# Patient Record
Sex: FEMALE | Race: WHITE | Employment: OTHER | URBAN - METROPOLITAN AREA
[De-identification: names, ages, dates, MRNs, and addresses within clinical notes are randomized per-mention and may not be internally consistent; named-entity substitution may affect disease eponyms.]

---

## 2017-02-13 RX ORDER — FUROSEMIDE 40 MG/1
40 TABLET ORAL DAILY
Qty: 90 TAB | Refills: 1 | Status: SHIPPED | COMMUNITY
Start: 2017-02-13 | End: 2017-08-12 | Stop reason: SDUPTHER

## 2017-02-13 RX ORDER — AMLODIPINE BESYLATE 5 MG/1
TABLET ORAL
Qty: 90 TAB | Refills: 1 | Status: SHIPPED | COMMUNITY
Start: 2017-02-13 | End: 2017-08-12 | Stop reason: SDUPTHER

## 2017-02-13 RX ORDER — ESOMEPRAZOLE MAGNESIUM 40 MG/1
CAPSULE, DELAYED RELEASE ORAL
Qty: 90 CAP | Refills: 1 | Status: SHIPPED | COMMUNITY
Start: 2017-02-13 | End: 2017-08-12 | Stop reason: SDUPTHER

## 2017-02-13 NOTE — TELEPHONE ENCOUNTER
Orders Placed This Encounter    NEXIUM 40 mg capsule     Sig: TAKE 1 CAPSULE DAILY     Dispense:  90 Cap     Refill:  1    amLODIPine (NORVASC) 5 mg tablet     Sig: TAKE 1 TABLET DAILY     Dispense:  90 Tab     Refill:  1    furosemide (LASIX) 40 mg tablet     Sig: Take 1 Tab by mouth daily. Dispense:  90 Tab     Refill:  1     The above medication refills were approved via verbal order by Dr. Tim Roberto III.

## 2017-04-04 RX ORDER — CLOPIDOGREL BISULFATE 75 MG/1
TABLET ORAL
Qty: 90 TAB | Refills: 0 | Status: SHIPPED | OUTPATIENT
Start: 2017-04-04 | End: 2017-07-03 | Stop reason: SDUPTHER

## 2017-05-11 RX ORDER — TELMISARTAN 40 MG/1
TABLET ORAL
Qty: 90 TAB | Refills: 0 | Status: SHIPPED | OUTPATIENT
Start: 2017-05-11 | End: 2017-08-09 | Stop reason: SDUPTHER

## 2017-07-03 RX ORDER — CLOPIDOGREL BISULFATE 75 MG/1
TABLET ORAL
Qty: 90 TAB | Refills: 1 | Status: SHIPPED | COMMUNITY
Start: 2017-07-03

## 2017-07-03 NOTE — TELEPHONE ENCOUNTER
Orders Placed This Encounter    clopidogrel (PLAVIX) 75 mg tab     Sig: TAKE 1 TABLET DAILY     Dispense:  90 Tab     Refill:  1     The above medication refills were approved via verbal order by Dr. Son Solorzano III.

## 2017-08-09 RX ORDER — TELMISARTAN 40 MG/1
TABLET ORAL
Qty: 90 TAB | Refills: 3 | Status: SHIPPED | OUTPATIENT
Start: 2017-08-09

## 2017-08-13 RX ORDER — AMLODIPINE BESYLATE 5 MG/1
TABLET ORAL
Qty: 90 TAB | Refills: 0 | Status: SHIPPED | OUTPATIENT
Start: 2017-08-13

## 2017-08-13 RX ORDER — FUROSEMIDE 40 MG/1
TABLET ORAL
Qty: 90 TAB | Refills: 0 | Status: SHIPPED | OUTPATIENT
Start: 2017-08-13

## 2017-08-13 RX ORDER — ESOMEPRAZOLE MAGNESIUM 40 MG/1
CAPSULE, DELAYED RELEASE ORAL
Qty: 90 CAP | Refills: 0 | Status: SHIPPED | OUTPATIENT
Start: 2017-08-13

## 2017-09-06 RX ORDER — SERTRALINE HYDROCHLORIDE 100 MG/1
TABLET, FILM COATED ORAL
Qty: 135 TAB | Refills: 4 | Status: SHIPPED | OUTPATIENT
Start: 2017-09-06 | End: 2018-02-12 | Stop reason: DRUGHIGH

## 2017-09-29 ENCOUNTER — TELEPHONE (OUTPATIENT)
Dept: INTERNAL MEDICINE CLINIC | Age: 82
End: 2017-09-29

## 2017-09-29 NOTE — TELEPHONE ENCOUNTER
[phone] daughter calling regarding an message she sent to dr Lewis Asher last night regarding her mother and has not heard anything back. Wants to know if he received it.

## 2017-10-03 LAB — CREATININE, EXTERNAL: 0.8

## 2017-11-17 RX ORDER — CEPHALEXIN 250 MG/1
CAPSULE ORAL
Qty: 30 CAP | Refills: 5 | Status: SHIPPED | OUTPATIENT
Start: 2017-11-17 | End: 2018-02-12 | Stop reason: SDUPTHER

## 2017-11-24 RX ORDER — CEPHALEXIN 250 MG/1
CAPSULE ORAL
Qty: 30 CAP | Refills: 5 | OUTPATIENT
Start: 2017-11-24

## 2018-01-01 ENCOUNTER — TELEPHONE (OUTPATIENT)
Dept: INTERNAL MEDICINE CLINIC | Age: 83
End: 2018-01-01

## 2018-01-01 RX ORDER — SERTRALINE HYDROCHLORIDE 25 MG/1
25 TABLET, FILM COATED ORAL DAILY
Qty: 90 TAB | Refills: 1 | Status: SHIPPED | OUTPATIENT
Start: 2018-01-01 | End: 2019-01-01 | Stop reason: SDUPTHER

## 2018-02-12 ENCOUNTER — OFFICE VISIT (OUTPATIENT)
Dept: INTERNAL MEDICINE CLINIC | Age: 83
End: 2018-02-12

## 2018-02-12 ENCOUNTER — TELEPHONE (OUTPATIENT)
Dept: INTERNAL MEDICINE CLINIC | Age: 83
End: 2018-02-12

## 2018-02-12 VITALS — RESPIRATION RATE: 12 BRPM | TEMPERATURE: 97.6 F | OXYGEN SATURATION: 93 % | HEART RATE: 50 BPM

## 2018-02-12 DIAGNOSIS — G30.1 LATE ONSET ALZHEIMER'S DISEASE WITHOUT BEHAVIORAL DISTURBANCE (HCC): ICD-10-CM

## 2018-02-12 DIAGNOSIS — F02.80 LATE ONSET ALZHEIMER'S DISEASE WITHOUT BEHAVIORAL DISTURBANCE (HCC): ICD-10-CM

## 2018-02-12 DIAGNOSIS — I48.20 CHRONIC ATRIAL FIBRILLATION (HCC): ICD-10-CM

## 2018-02-12 DIAGNOSIS — E03.9 ACQUIRED HYPOTHYROIDISM: ICD-10-CM

## 2018-02-12 DIAGNOSIS — I50.22 CHRONIC SYSTOLIC CONGESTIVE HEART FAILURE (HCC): ICD-10-CM

## 2018-02-12 DIAGNOSIS — L97.519 ULCER OF RIGHT FOOT, UNSPECIFIED ULCER STAGE (HCC): ICD-10-CM

## 2018-02-12 DIAGNOSIS — Z23 NEED FOR SHINGLES VACCINE: Primary | ICD-10-CM

## 2018-02-12 RX ORDER — MORPHINE SULFATE ORAL SOLUTION 10 MG/5ML
0.25 SOLUTION ORAL AS NEEDED
COMMUNITY

## 2018-02-12 RX ORDER — IPRATROPIUM BROMIDE AND ALBUTEROL SULFATE 2.5; .5 MG/3ML; MG/3ML
3 SOLUTION RESPIRATORY (INHALATION)
COMMUNITY

## 2018-02-12 RX ORDER — SERTRALINE HYDROCHLORIDE 25 MG/1
25 TABLET, FILM COATED ORAL DAILY
Qty: 30 TAB | Refills: 1 | Status: SHIPPED | OUTPATIENT
Start: 2018-02-12 | End: 2018-02-12

## 2018-02-12 RX ORDER — SERTRALINE HYDROCHLORIDE 25 MG/1
25 TABLET, FILM COATED ORAL DAILY
Qty: 30 TAB | Refills: 1 | Status: SHIPPED | OUTPATIENT
Start: 2018-02-12 | End: 2018-01-01 | Stop reason: SDUPTHER

## 2018-02-12 RX ORDER — CEPHALEXIN 250 MG/1
250 CAPSULE ORAL 3 TIMES DAILY
Qty: 42 CAP | Refills: 0 | Status: SHIPPED | OUTPATIENT
Start: 2018-02-12 | End: 2018-02-12

## 2018-02-12 RX ORDER — CEPHALEXIN 250 MG/1
250 CAPSULE ORAL 3 TIMES DAILY
Qty: 42 CAP | Refills: 0 | Status: SHIPPED | OUTPATIENT
Start: 2018-02-12 | End: 2018-04-13 | Stop reason: SDUPTHER

## 2018-02-12 NOTE — PATIENT INSTRUCTIONS
As discussed in your appointment today, [address] is an important part of planning for your healthcare future. Discussing your preferences with your family and your care team is a part of good healthcare so that we can be guided by your known values and goals. Our office offers this service for you at your convenience. Our Nurse Navigators and certified [employer] Facilitators, Checo Patton and Briana Stauffer typically schedule family appointments for this service on Wednesdays. To schedule an Advance Care Planning visit or to receive more information about this service, please call [facility] at [phone] and ask to speak directly to Mikie Russ or [facility]. Advance Care Planning: Care Instructions  Your Care Instructions  It can be hard to live with an illness that cannot be cured. But if your health is getting worse, you may want to make decisions about end-of-life care. Planning for the end of your life does not mean that you are giving up. It is a way to make sure that your wishes are met. Clearly stating your wishes can make it easier for your loved ones. Making plans while you are still able may also ease your mind and make your final days less stressful and more meaningful. Follow-up care is a key part of your treatment and safety. Be sure to make and go to all appointments, and call your doctor if you are having problems. It's also a good idea to know your test results and keep a list of the medicines you take. What can you do to plan for the end of life? · You can bring these issues up with your doctor. You do not need to wait until your doctor starts the conversation. You might start with \"I would not be willing to live with . Omid Jews Omid Jews Omid Jews \" When you complete this sentence it helps your doctor understand your wishes. · Talk openly and honestly with your doctor. This is the best way to understand the decisions you will need to make as your health changes.  Know that you can always change your mind. · Ask your doctor about commonly used life-support measures. These include tube feedings, breathing machines, and fluids given through a vein (IV). Understanding these treatments will help you decide whether you want them. · You may choose to have these life-supporting treatments for a limited time. This allows a trial period to see whether they will help you. You may also decide that you want your doctor to take only certain measures to keep you alive. It is important to spell out these conditions so that your doctor and family understand them. · Talk to your doctor about how long you are likely to live. He or she may be able to give you an idea of what usually happens with your specific illness. · Think about preparing papers that state your wishes. This way there will not be any confusion about what you want. You can change your instructions at any time. Which papers should you prepare? Advance directives are legal papers that tell doctors how you want to be cared for at the end of your life. You do not need a  to write these papers. Ask your doctor or your state health department for information on how to write your advance directives. They may have the forms for each of these types of papers. Make sure your doctor has a copy of these on file, and give a copy to a family member or close friend. · Consider a do-not-resuscitate order (DNR). This order asks that no extra treatments be done if your heart stops or you stop breathing. Extra treatments may include electrical shock to restart your heart or a machine to breathe for you. If you decide to have a DNR order, ask your doctor to explain and write it. Place the order in your home where everyone can easily see it. · Consider a living will. A living will explains your wishes in case you are in a coma or cannot communicate. Living talbot tell doctors to use or not use treatments that would keep you alive.  You must have one or two witnesses or a notary present when you sign this form. · Consider a durable power of . This allows you to name a person to make decisions about your care if you are not able to. Most people ask a close friend or family member. Talk to this person about the kinds of treatments you want and those that you do not want. Make sure this person understands your wishes. If this person is not the health care agent named in your advance directive, also talk with your health care agent. These legal papers are simple to change. Tell your doctor what you want to change, and ask him or her to make a note in your medical file. Give your family updated copies of the papers.

## 2018-02-12 NOTE — PROGRESS NOTES
HPI:  Nick Portillo is a 80y.o. year old female who is here for a routine visit:    Here for a follow-up visit after a years absence. She has a history of progressive dementia and is now completely nonverbal.  She lives in assisted living and her daughter is with her now to provide history. Somehow her UTI prophylactic antibiotics have been stopped by hospice. This has now been discontinued and she is now back in the care of the facility. She does note increasing difficulty with tearfulness and appears to be depressed. She has been off of her Zoloft per hospice as well. Concern was in the past about a swallowing issue. We tried to avoid any further evaluation of that given her progression of dementia. She has an appointment coming up with her cardiologist as well for follow-up of her pacemaker. Her daughters also noted that her right foot has a skin lesion on it that will not heal.  Her left foot has a toenail that was nearly avulsed by a nurses aide putting a sock on. She is concerned about possibility of peripheral vascular disease. She does have some swelling in toes of both feet. Denies any change in bowel or bladder habits per daughter.       Past Medical History:   Diagnosis Date    Arrhythmia     A fib    Arthritis     Atrial fibrillation ([address] 12/21/2010    CAD (coronary artery disease)     Calculus of kidney     Chronic obstructive pulmonary disease (HCC)     Congestive heart failure, unspecified 12/21/2010    Depression     GERD (gastroesophageal reflux disease)     Hypercholesterolemia     Hypertension     Thyroid disease     hypo    Unspecified essential hypertension 12/21/2010    Unspecified hypothyroidism 12/21/2010    Upper GI bleed 12/21/2010    UTI (lower urinary tract infection)        Past Surgical History:   Procedure Laterality Date    HX CATARACT REMOVAL      bilateral    HX HEENT      basal cell ca nose    HX PACEMAKER PLACEMENT         Prior to Admission medications    Medication Sig Start Date End Date Taking? Authorizing Provider   varicella-zoster recombinant, PF, (SHINGRIX, PF,) 50 mcg/0.5 mL susr injection 0.5 mL by IntraMUSCular route once for 1 dose. 2/12/18 2/12/18 Yes Genna Russ III, MD   morphine 10 mg/5 mL oral solution Take 0.25 mL by mouth as needed for Pain. Yes Historical Provider   albuterol-ipratropium (DUO-NEB) 2.5 mg-0.5 mg/3 ml nebu 3 mL by Nebulization route. Yes Historical Provider   amLODIPine (NORVASC) 5 mg tablet TAKE 1 TABLET DAILY 8/13/17  Yes Genna Russ III, MD   ondansetron (ZOFRAN ODT) 8 mg disintegrating tablet Take 1 Tab by mouth every eight (8) hours as needed for Nausea. 12/29/16  Yes Blanche Medina MD   clotrimazole (LOTRIMIN) 1 % topical cream Apply  to affected area daily. 12/22/16  Yes Genna Russ III, MD   aspirin delayed-release 81 mg tablet Take 81 mg by mouth daily. Yes Historical Provider   food supplemt, lactose-reduced (ENSURE) liqd Take 237 mL by mouth daily. Yes Historical Provider   acetaminophen (TYLENOL) 650 mg suppository Insert  into rectum every four (4) hours as needed for Fever. Yes Historical Provider   ATROPINE SULFATE, PF, OP Apply  to eye. Yes Historical Provider   bisacodyl (DULCOLAX) 10 mg suppository Insert 10 mg into rectum daily as needed. Yes Historical Provider   haloperidol (HALDOL) 2 mg/mL oral concentrate Take 2 mg by mouth every six (6) hours as needed. Yes Historical Provider   LORazepam (INTENSOL) 2 mg/mL concentrated solution Take 1 mg by mouth every two (2) hours as needed for Agitation or Anxiety. Yes Historical Provider   nitroglycerin (NITROSTAT) 0.4 mg SL tablet by SubLINGual route every five (5) minutes as needed for Chest Pain. Yes Historical Provider   levothyroxine (SYNTHROID) 100 mcg tablet Take 1 Tab by mouth Daily (before breakfast).  12/29/15  Yes Genna Russ III, MD   metoprolol (LOPRESSOR) 25 mg tablet TAKE 1 TABLET TWICE A DAY 2/19/15  Yes Annelise Payton MD   Wheel Chair nathaniel One chair for weakness and debilitation after Sepsis, UTI, and C diff. Also COPD, CHF, and dementia. 11/24/14  Yes Elayne Alvarez III, MD   amiodarone (PACERONE) 100 mg tablet Take 100 mg by mouth every other day. Alternate with 200mg   Yes Historical Provider   NEXIUM 40 mg capsule TAKE 1 CAPSULE DAILY 8/13/17   Elayne Alvarez III, MD   furosemide (LASIX) 40 mg tablet TAKE 1 TABLET DAILY 8/13/17   Elayne Alvarez III, MD   MICARDIS 40 mg tablet TAKE 1 TABLET DAILY 8/9/17   Elayne Alvarez III, MD   clopidogrel (PLAVIX) 75 mg tab TAKE 1 TABLET DAILY 7/3/17   Elayne Alvarez III, MD   nitrofurantoin (MACRODANTIN) 50 mg capsule 1 cap daily on odd months 12/22/16   Elayne Alvarez III, MD   promethazine (PHENERGAN) 25 mg suppository Insert 25 mg into rectum every eight (8) hours as needed for Nausea. Historical Provider   starch, thickening, (THICK-IT #2) powd Take  by mouth. Historical Provider   Cholecalciferol, Vitamin D3, (VITAMIN D3) 1,000 unit cap Take 1 Cap by mouth daily. Called to [facility] 7/18/13   Elayne Alvarez III, MD   albuterol (PROVENTIL HFA, VENTOLIN HFA) 90 mcg/actuation inhaler Take 1 Puff by inhalation every four (4) hours as needed for Wheezing or Shortness of Breath. 7/12/13   Annelise Payton MD       Social History     Social History    Marital status:      Spouse name: N/A    Number of children: N/A    Years of education: N/A     Occupational History    Not on file.      Social History Main Topics    Smoking status: Former Smoker     Quit date: 1/21/2000    Smokeless tobacco: Never Used    Alcohol use No    Drug use: No    Sexual activity: Not on file     Other Topics Concern    Not on file     Social History Narrative          ROS  Per HPI    Visit Vitals    Pulse (!) 50    Temp 97.6 °F (36.4 °C) (Oral)    Resp 12    SpO2 93%         Physical Exam   Physical Examination: General appearance - chronically ill appearing, uncooperative and grabbing at me and scratched the nurse when attempting to get her blood pressure. Chest - clear to auscultation, no wheezes, rales or rhonchi, symmetric air entry  Heart - normal rate and regular rhythm  Abdomen - soft, nontender, nondistended, no masses or organomegaly  Both lower extremities have swelling of her toes. Pulses are trace in both feet. She does have some redness along the distal aspect of the left second toe where her toenail held in place with a bandage. She has thickened onychomycotic nails on both feet. She does have an ulcer on the dorsal aspect of the right foot fifth metatarsal distally with a small scab in its center. Minimal surrounding erythema. Assessment/Plan:  Diagnoses and all orders for this visit:    1. Need for shingles vaccine    2. Chronic systolic congestive heart failure (HCC) -appears stable. -     [address]    3. Chronic atrial fibrillation (HCC) -appears stable. -     CBC WITH AUTOMATED DIFF  -     UA/M W/RFLX CULTURE, ROUTINE  -     METABOLIC PANEL, COMPREHENSIVE    4. Acquired hypothyroidism -difficult to assess. Will get labs and adjust meds as needed. -     TSH 3RD GENERATION  -     T4, FREE  -     CBC WITH AUTOMATED DIFF  -     UA/M W/RFLX CULTURE, ROUTINE  -     METABOLIC PANEL, COMPREHENSIVE  -     REFERRAL TO HOME HEALTH    5. Ulcer of right foot, unspecified ulcer stage [facility]) -will refer for wound care. We had a long discussion today about an evaluation for vascular insufficiency and given her underlying dementia will defer that for now. Her daughter agrees. The question about some infection will treat with antibiotics. This would also cover her for recurrent urinary tract infections currently.  -     REFERRAL TO HOME HEALTH    6. Late onset Alzheimer's disease without behavioral disturbance -? Depressed. Will add back low-dose sertraline and see if this helps.   Her daughter will let me know in 2 weeks how that is going. Other orders  -     varicella-zoster recombinant, PF, (SHINGRIX, PF,) 50 mcg/0.5 mL susr injection; 0.5 mL by IntraMUSCular route once for 1 dose. Follow-up Disposition:  Return for Wellness Visit. Advised her to call back or return to office if symptoms worsen/change/persist.  Discussed expected course/resolution/complications of diagnosis in detail with patient. Medication risks/benefits/costs/interactions/alternatives discussed with patient. She was given an after visit summary which includes diagnoses, current medications, & vitals. She expressed understanding with the diagnosis and plan.

## 2018-02-12 NOTE — MR AVS SNAPSHOT
[address] 
[phone] Patient: Naheed Lane MRN:  [id number] Visit Information Date & Time Provider Department Dept. Phone Encounter #  
 2/12/2018 10:15 AM Alon Clay MD [address] [address] Internal Medicine [phone] [id number] Follow-up Instructions Return for Wellness Visit. Upcoming Health Maintenance Date Due  
 MEDICARE YEARLY EXAM 2/13/2019 GLAUCOMA SCREENING Q2Y 2/12/2020 DTaP/Tdap/Td series (2 - Td) 6/17/2023 Allergies as of 2/12/2018  Review Complete On: 2/12/2018 By: Brad Roblero LPN No Known Allergies Current Immunizations  Reviewed on 11/24/2014 Name Date Influenza Vaccine 10/1/2013 Influenza Vaccine Split 11/14/2011, 12/23/2010 Influenza Vaccine Whole 10/5/2012 PPD 12/21/2010 Pneumococcal Vaccine (Unspecified Type) 10/1/2012 Tdap 6/17/2013  2:09 PM  
 ZZZ-RETIRED (DO NOT USE) Pneumococcal Vaccine (Unspecified Type) 1/1/2002 Not reviewed this visit You Were Diagnosed With   
  
 Codes Comments Need for shingles vaccine    -  Primary ICD-10-CM: P08 ICD-9-CM: V04.89 Chronic systolic congestive heart failure (HCC)     ICD-10-CM: I50.22 ICD-9-CM: 428.22, 428.0 Chronic atrial fibrillation (HCC)     ICD-10-CM: D25.0 ICD-9-CM: 427.31 Acquired hypothyroidism     ICD-10-CM: E03.9 ICD-9-CM: 244.9 Ulcer of right foot, unspecified ulcer stage ([facility] [address]     ICD-10-CM: T07.862 ICD-9-CM: 707.15 Late onset Alzheimer's disease without behavioral disturbance     ICD-10-CM: G30.1, F02.80 ICD-9-CM: 331.0, 294.10 Vitals Pulse Temp Resp SpO2 OB Status Smoking Status (!) 50 97.6 °F (36.4 °C) (Oral) 12 93% Postmenopausal Former Smoker Vitals History Preferred Pharmacy Pharmacy Name Phone [id number] [address] [facility] [phone] Your Updated Medication List  
  
   
This list is accurate as of: 2/12/18 11:38 AM.  Always use your most recent med list.  
  
  
  
  
 acetaminophen 650 mg suppository Commonly known as:  TYLENOL Insert  into rectum every four (4) hours as needed for Fever. albuterol 90 mcg/actuation inhaler Commonly known as:  PROVENTIL HFA, VENTOLIN HFA, PROAIR HFA Take 1 Puff by inhalation every four (4) hours as needed for Wheezing or Shortness of Breath. albuterol-ipratropium 2.5 mg-0.5 mg/3 ml Nebu Commonly known as:  DUO-NEB  
3 mL by Nebulization route. amLODIPine 5 mg tablet Commonly known as:  Jayme Edmond TAKE 1 TABLET DAILY  
  
 aspirin delayed-release 81 mg tablet Take 81 mg by mouth daily. ATROPINE SULFATE (PF) OP Apply  to eye.  
  
 bisacodyl 10 mg suppository Commonly known as:  DULCOLAX Insert 10 mg into rectum daily as needed. cephALEXin 250 mg capsule Commonly known as:  Lugene Newton Take 1 Cap by mouth three (3) times daily. cholecalciferol 1,000 unit Cap Commonly known as:  VITAMIN D3 Take 1 Cap by mouth daily. Called to [facility]  
  
 clopidogrel 75 mg Tab Commonly known as:  PLAVIX TAKE 1 TABLET DAILY  
  
 clotrimazole 1 % topical cream  
Commonly known as:  Vesta Shipman Apply  to affected area daily. ENSURE Liqd Generic drug:  food supplemt, lactose-reduced Take 237 mL by mouth daily. furosemide 40 mg tablet Commonly known as:  LASIX TAKE 1 TABLET DAILY  
  
 haloperidol 2 mg/mL oral concentrate Commonly known as:  HALDOL Take 2 mg by mouth every six (6) hours as needed. levothyroxine 100 mcg tablet Commonly known as:  SYNTHROID Take 1 Tab by mouth Daily (before breakfast). LORazepam 2 mg/mL concentrated solution Commonly known as:  INTENSOL Take 1 mg by mouth every two (2) hours as needed for Agitation or Anxiety. metoprolol tartrate 25 mg tablet Commonly known as:  LOPRESSOR  
 TAKE 1 TABLET TWICE A DAY  
  
 MICARDIS 40 mg tablet Generic drug:  telmisartan TAKE 1 TABLET DAILY  
  
 morphine 10 mg/5 mL oral solution Take 0.25 mL by mouth as needed for Pain. NexIUM 40 mg capsule Generic drug:  esomeprazole TAKE 1 CAPSULE DAILY  
  
 nitrofurantoin 50 mg capsule Commonly known as:  MACRODANTIN  
1 cap daily on odd months NITROSTAT 0.4 mg SL tablet Generic drug:  nitroglycerin  
by SubLINGual route every five (5) minutes as needed for Chest Pain. ondansetron 8 mg disintegrating tablet Commonly known as:  ZOFRAN ODT Take 1 Tab by mouth every eight (8) hours as needed for Nausea. PACERONE 100 mg tablet Generic drug:  amiodarone Take 100 mg by mouth every other day. Alternate with 200mg  
  
 promethazine 25 mg suppository Commonly known as:  PHENERGAN Insert 25 mg into rectum every eight (8) hours as needed for Nausea. sertraline 25 mg tablet Commonly known as:  ZOLOFT Take 1 Tab by mouth daily. THICK-IT #2 Powd Generic drug:  starch (thickening) Take  by mouth.  
  
 varicella-zoster recombinant (PF) 50 mcg/0.5 mL Susr injection Commonly known as:  SHINGRIX (PF)  
0.5 mL by IntraMUSCular route once for 1 dose. [address] [facility] One chair for weakness and debilitation after Sepsis, UTI, and C diff. Also COPD, CHF, and dementia. Prescriptions Printed Refills  
 sertraline (ZOLOFT) 25 mg tablet 1 Sig: Take 1 Tab by mouth daily. Class: Print Route: Oral  
 cephALEXin (KEFLEX) 250 mg capsule 0 Sig: Take 1 Cap by mouth three (3) times daily. Class: Print Route: Oral  
  
Prescriptions Sent to Pharmacy Refills  
 varicella-zoster recombinant, PF, (SHINGRIX, PF,) 50 mcg/0.5 mL susr injection 1 Si.5 mL by IntraMUSCular route once for 1 dose. Class: Normal  
 Pharmacy: [address], [address] [place] [address] #: [phone] Route: IntraMUSCular We Performed the Following CBC WITH AUTOMATED DIFF [10118 CPT(R)] METABOLIC PANEL, COMPREHENSIVE [86869 CPT(R)] T4, FREE [id number] CPT(R)] TSH 3RD GENERATION [45403 CPT(R)] UA/M W/RFLX CULTURE, ROUTINE [[id number] Custom] Follow-up Instructions Return for Wellness Visit. Patient Instructions As discussed in your appointment today, [address] is an important part of planning for your healthcare future. Discussing your preferences with your family and your care team is a part of good healthcare so that we can be guided by your known values and goals. Our office offers this service for you at your convenience. Our Nurse Navigators and certified [employer] Facilitators, Rachael Parisi and Jose Mancilla typically schedule family appointments for this service on Wednesdays. To schedule an Advance Care Planning visit or to receive more information about this service, please call [facility] at [phone] and ask to speak directly to Stacey Godoy or [employer]. Advance Care Planning: Care Instructions Your Care Instructions It can be hard to live with an illness that cannot be cured. But if your health is getting worse, you may want to make decisions about end-of-life care. Planning for the end of your life does not mean that you are giving up. It is a way to make sure that your wishes are met. Clearly stating your wishes can make it easier for your loved ones. Making plans while you are still able may also ease your mind and make your final days less stressful and more meaningful. Follow-up care is a key part of your treatment and safety. Be sure to make and go to all appointments, and call your doctor if you are having problems. It's also a good idea to know your test results and keep a list of the medicines you take. What can you do to plan for the end of life? · You can bring these issues up with your doctor. You do not need to wait until your doctor starts the conversation. You might start with \"I would not be willing to live with . Erick Reyna \" When you complete this sentence it helps your doctor understand your wishes. · Talk openly and honestly with your doctor. This is the best way to understand the decisions you will need to make as your health changes. Know that you can always change your mind. · Ask your doctor about commonly used life-support measures. These include tube feedings, breathing machines, and fluids given through a vein (IV). Understanding these treatments will help you decide whether you want them. · You may choose to have these life-supporting treatments for a limited time. This allows a trial period to see whether they will help you. You may also decide that you want your doctor to take only certain measures to keep you alive. It is important to spell out these conditions so that your doctor and family understand them. · Talk to your doctor about how long you are likely to live. He or she may be able to give you an idea of what usually happens with your specific illness. · Think about preparing papers that state your wishes. This way there will not be any confusion about what you want. You can change your instructions at any time. Which papers should you prepare? Advance directives are legal papers that tell doctors how you want to be cared for at the end of your life. You do not need a  to write these papers. Ask your doctor or your state health department for information on how to write your advance directives. They may have the forms for each of these types of papers. Make sure your doctor has a copy of these on file, and give a copy to a family member or close friend. · Consider a do-not-resuscitate order (DNR). This order asks that no extra treatments be done if your heart stops or you stop breathing.  Extra treatments may include electrical shock to restart your heart or a machine to breathe for you. If you decide to have a DNR order, ask your doctor to explain and write it. Place the order in your home where everyone can easily see it. · Consider a living will. A living will explains your wishes in case you are in a coma or cannot communicate. Living talbot tell doctors to use or not use treatments that would keep you alive. You must have one or two witnesses or a notary present when you sign this form. · Consider a durable power of . This allows you to name a person to make decisions about your care if you are not able to. Most people ask a close friend or family member. Talk to this person about the kinds of treatments you want and those that you do not want. Make sure this person understands your wishes. If this person is not the health care agent named in your advance directive, also talk with your health care agent. These legal papers are simple to change. Tell your doctor what you want to change, and ask him or her to make a note in your medical file. Give your family updated copies of the papers. Introducing [facility] & [facility] SERVICES! Dear Mi Jones: Thank you for requesting a [employer] account. Our records indicate that you already have an active [employer] account. You can access your account anytime at [email or web address] Did you know that you can access your hospital and ER discharge instructions at any time in [employer]? You can also review all of your test results from your hospital stay or ER visit. Additional Information If you have questions, please visit the Frequently Asked Questions section of the [employer] website at [email or web address] Remember, [employer] is NOT to be used for urgent needs. For medical emergencies, dial 911. Now available from your iPhone and Android! Please provide this summary of care documentation to your next provider. Your primary care clinician is listed as [place] [phone] If you have any questions after today's visit, please call [phone].

## 2018-02-12 NOTE — PROGRESS NOTES
Please have nursing/aides elevate Latonia Squires's legs for 1 hour each day between breakfast and lunch, so some time around 10 or 10:30am.  Please make sure pt's heals are floated with a pillow during elevation so they are not resting on hard surface. If you have any questions, please feel free to contact Dr Oksana Keen', PCP office at [phone].

## 2018-02-12 NOTE — TELEPHONE ENCOUNTER
[phone]     Has a question about home health order.

## 2018-02-13 ENCOUNTER — HOME HEALTH ADMISSION (OUTPATIENT)
Dept: HOME HEALTH SERVICES | Facility: HOME HEALTH | Age: 83
End: 2018-02-13
Payer: MEDICARE

## 2018-02-15 ENCOUNTER — HOME CARE VISIT (OUTPATIENT)
Dept: SCHEDULING | Facility: HOME HEALTH | Age: 83
End: 2018-02-15
Payer: MEDICARE

## 2018-02-15 PROCEDURE — 3331090002 HH PPS REVENUE DEBIT

## 2018-02-15 PROCEDURE — G0299 HHS/HOSPICE OF RN EA 15 MIN: HCPCS

## 2018-02-15 PROCEDURE — 3331090001 HH PPS REVENUE CREDIT

## 2018-02-15 PROCEDURE — 400013 HH SOC

## 2018-02-16 VITALS
WEIGHT: 104 LBS | TEMPERATURE: 97.6 F | SYSTOLIC BLOOD PRESSURE: 118 MMHG | DIASTOLIC BLOOD PRESSURE: 68 MMHG | HEART RATE: 71 BPM | OXYGEN SATURATION: 98 % | BODY MASS INDEX: 19.14 KG/M2 | HEIGHT: 62 IN | RESPIRATION RATE: 18 BRPM

## 2018-02-16 PROCEDURE — 3331090001 HH PPS REVENUE CREDIT

## 2018-02-16 PROCEDURE — 3331090002 HH PPS REVENUE DEBIT

## 2018-02-17 PROCEDURE — 3331090002 HH PPS REVENUE DEBIT

## 2018-02-17 PROCEDURE — 3331090001 HH PPS REVENUE CREDIT

## 2018-02-18 PROCEDURE — 3331090002 HH PPS REVENUE DEBIT

## 2018-02-18 PROCEDURE — 3331090001 HH PPS REVENUE CREDIT

## 2018-02-19 PROCEDURE — 3331090001 HH PPS REVENUE CREDIT

## 2018-02-19 PROCEDURE — 3331090002 HH PPS REVENUE DEBIT

## 2018-02-20 ENCOUNTER — HOME CARE VISIT (OUTPATIENT)
Dept: HOME HEALTH SERVICES | Facility: HOME HEALTH | Age: 83
End: 2018-02-20
Payer: MEDICARE

## 2018-02-20 ENCOUNTER — HOME CARE VISIT (OUTPATIENT)
Dept: HOME HEALTH SERVICES | Facility: HOME HEALTH | Age: 83
End: 2018-02-20

## 2018-02-20 PROCEDURE — 3331090002 HH PPS REVENUE DEBIT

## 2018-02-20 PROCEDURE — 3331090001 HH PPS REVENUE CREDIT

## 2018-02-21 PROCEDURE — 3331090002 HH PPS REVENUE DEBIT

## 2018-02-21 PROCEDURE — 3331090001 HH PPS REVENUE CREDIT

## 2018-02-22 PROCEDURE — 3331090002 HH PPS REVENUE DEBIT

## 2018-02-22 PROCEDURE — 3331090001 HH PPS REVENUE CREDIT

## 2018-02-23 ENCOUNTER — HOME CARE VISIT (OUTPATIENT)
Dept: SCHEDULING | Facility: HOME HEALTH | Age: 83
End: 2018-02-23
Payer: MEDICARE

## 2018-02-23 PROCEDURE — 3331090001 HH PPS REVENUE CREDIT

## 2018-02-23 PROCEDURE — G0300 HHS/HOSPICE OF LPN EA 15 MIN: HCPCS

## 2018-02-23 PROCEDURE — 3331090002 HH PPS REVENUE DEBIT

## 2018-02-24 PROCEDURE — 3331090002 HH PPS REVENUE DEBIT

## 2018-02-24 PROCEDURE — 3331090001 HH PPS REVENUE CREDIT

## 2018-02-25 PROCEDURE — 3331090002 HH PPS REVENUE DEBIT

## 2018-02-25 PROCEDURE — 3331090001 HH PPS REVENUE CREDIT

## 2018-02-26 PROCEDURE — 3331090001 HH PPS REVENUE CREDIT

## 2018-02-26 PROCEDURE — 3331090002 HH PPS REVENUE DEBIT

## 2018-02-27 ENCOUNTER — HOME CARE VISIT (OUTPATIENT)
Dept: SCHEDULING | Facility: HOME HEALTH | Age: 83
End: 2018-02-27
Payer: MEDICARE

## 2018-02-27 VITALS — RESPIRATION RATE: 18 BRPM | TEMPERATURE: 97.2 F

## 2018-02-27 PROCEDURE — 3331090003 HH PPS REVENUE ADJ

## 2018-02-27 PROCEDURE — G0299 HHS/HOSPICE OF RN EA 15 MIN: HCPCS

## 2018-02-27 PROCEDURE — 3331090001 HH PPS REVENUE CREDIT

## 2018-02-27 PROCEDURE — 3331090002 HH PPS REVENUE DEBIT

## 2018-02-28 VITALS — OXYGEN SATURATION: 98 % | TEMPERATURE: 98.1 F

## 2018-04-13 ENCOUNTER — TELEPHONE (OUTPATIENT)
Dept: INTERNAL MEDICINE CLINIC | Age: 83
End: 2018-04-13

## 2018-04-13 RX ORDER — CEPHALEXIN 250 MG/1
250 CAPSULE ORAL 3 TIMES DAILY
Qty: 42 CAP | Refills: 0 | Status: SHIPPED | OUTPATIENT
Start: 2018-04-13 | End: 2018-01-01

## 2018-04-13 RX ORDER — CEPHALEXIN 250 MG/1
250 CAPSULE ORAL 3 TIMES DAILY
Qty: 42 CAP | Refills: 0 | Status: SHIPPED | OUTPATIENT
Start: 2018-04-13 | End: 2018-04-13

## 2018-04-13 NOTE — TELEPHONE ENCOUNTER
Orders Placed This Encounter    DISCONTD: cephALEXin (KEFLEX) 250 mg capsule     Sig: Take 1 Cap by mouth three (3) times daily. Dispense:  42 Cap     Refill:  0    cephALEXin (KEFLEX) 250 mg capsule     Sig: Take 1 Cap by mouth three (3) times daily for 14 days. Dispense:  42 Cap     Refill:  0     The above medication refills were approved via verbal order by Dr. Lyn De Guzman III.        Faxed to [address] [place] [address]

## 2018-04-26 NOTE — TELEPHONE ENCOUNTER
Orders Placed This Encounter    sertraline (ZOLOFT) 25 mg tablet     Sig: Take 1 Tab by mouth daily. Dispense:  90 Tab     Refill:  1     The above medication refills were approved via verbal order by Dr. Aryan Sofia III.

## 2018-12-14 NOTE — TELEPHONE ENCOUNTER
Spoke with Al from [address] [place] [address],[address] who is calling to get rx for Celebrex 200 mg BID. I explained that Gregory Call has never written pt for this medication. The facility has it under his name, but she was prescribed it when she was in rehab. Per [facility], to d/c the order. Pt is on Plavix and has hx GI bleed. Al verbalized understanding.

## 2019-01-01 ENCOUNTER — TELEPHONE (OUTPATIENT)
Dept: INTERNAL MEDICINE CLINIC | Age: 84
End: 2019-01-01

## 2019-01-01 LAB
CREATININE, EXTERNAL: 1.26
INR, EXTERNAL: 1
PT, EXTERNAL: 11.4

## 2019-01-01 RX ORDER — SERTRALINE HYDROCHLORIDE 25 MG/1
25 TABLET, FILM COATED ORAL DAILY
Qty: 90 TAB | Refills: 0 | Status: SHIPPED | OUTPATIENT
Start: 2019-01-01

## 2019-01-15 NOTE — TELEPHONE ENCOUNTER
Orders Placed This Encounter    sertraline (ZOLOFT) 25 mg tablet     Sig: Take 1 Tab by mouth daily. Dispense:  90 Tab     Refill:  0     The above orders were approved via [facility] per Dr. Martin Gordon, III.

## 2019-02-18 NOTE — TELEPHONE ENCOUNTER
Spoke with Tamra from [place] [address] [address] and gave vo per [facility] for speech therapy rosey.

## 2019-03-21 NOTE — TELEPHONE ENCOUNTER
Left detailed message for the nurse at [address] [facility] to return the call and speak with a nurse if they need wound care orders. Pt's last tdap 2013.

## 2019-06-26 NOTE — TELEPHONE ENCOUNTER
[address] (Pharmacy) [phone]     Pharmacy requesting RX for Celecoxib 200mg #40 (Celebrex 200mg caps). Take 1 capsule by mouth twice daily for nerve pain.     Not found on patient's med list. Received signed and completed form from Physician's Office.    Form was faxed to [phone](Disability) and [phone](FMLA).
